# Patient Record
Sex: MALE | Race: WHITE
[De-identification: names, ages, dates, MRNs, and addresses within clinical notes are randomized per-mention and may not be internally consistent; named-entity substitution may affect disease eponyms.]

---

## 2018-01-06 ENCOUNTER — HOSPITAL ENCOUNTER (EMERGENCY)
Dept: HOSPITAL 80 - FED | Age: 58
LOS: 1 days | Discharge: HOME | End: 2018-01-07
Payer: COMMERCIAL

## 2018-01-06 VITALS — TEMPERATURE: 97.7 F | RESPIRATION RATE: 16 BRPM
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
## 2018-01-06 DIAGNOSIS — I10: ICD-10-CM

## 2018-01-06 DIAGNOSIS — Z04.1: ICD-10-CM
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
## 2018-01-06 DIAGNOSIS — F10.920: Primary | ICD-10-CM

## 2018-01-07 VITALS — DIASTOLIC BLOOD PRESSURE: 80 MMHG | OXYGEN SATURATION: 95 % | HEART RATE: 88 BPM | SYSTOLIC BLOOD PRESSURE: 121 MMHG

## 2019-03-23 ENCOUNTER — HOSPITAL ENCOUNTER (EMERGENCY)
Dept: HOSPITAL 80 - FED | Age: 59
Discharge: HOME | End: 2019-03-23
Payer: COMMERCIAL

## 2019-03-23 VITALS — DIASTOLIC BLOOD PRESSURE: 77 MMHG | SYSTOLIC BLOOD PRESSURE: 129 MMHG

## 2019-03-23 DIAGNOSIS — Z47.1: Primary | ICD-10-CM

## 2019-03-23 DIAGNOSIS — Z96.641: ICD-10-CM

## 2019-03-23 NOTE — EDPHY
H & P


Time Seen by Provider: 03/23/19 22:26


HPI/ROS: 





Chief complaint: Surgical incision check





History of present illness:  This is a 58-year-old male who presents to the 

emergency department for evaluation of a surgical incision to his right hip 

from a right total hip replacement that occurred 6 days ago [facility].  He 

states over the last few days he has noticed increasing discharge from the 

wound.  It has soaked the surgical wound covering.  He has talked with the on-

call nurse for the orthopedic group who stated this was normal.  He denies 

increasing pain in the region.  He denies pustular discharge.  He denies fevers

, he has been monitoring his temperature constantly.


Smoking Status: Never smoked


Physical Exam: 





General:  Alert, nontoxic.


Skin:  There is a vertically oriented large surgical incision to the right hip 

with staples in place.  There is no dehiscence.  There is no discharge from the 

wound.  The tissue appears healthy.  There is no surrounding erythema or edema.

  No warmth or tenderness on palpation.


Constitutional: 


 Initial Vital Signs











Temperature (C)  36.7 C   03/23/19 22:11


 


Heart Rate  82   03/23/19 22:11


 


Respiratory Rate  16   03/23/19 22:11


 


Blood Pressure  124/75 H  03/23/19 22:11


 


O2 Sat (%)  95   03/23/19 22:11








 











O2 Delivery Mode               Room Air














Allergies/Adverse Reactions: 


 





bee venom protein (honey bee) Allergy (Verified 03/23/19 22:10)


 








Home Medications: 














 Medication  Instructions  Recorded


 


Htn Med  04/12/16


 


Allopurinol  03/23/19


 


Aspirin 81mg (*)  03/23/19


 


Flexeril 10 MG (*)  03/23/19


 


Lovenox 40 MG (*)  03/23/19


 


Multivitamin  03/23/19


 


Percocet 5-325 mg Tablet  03/23/19


 


amLODIPine BESYLATE/BENAZEPRIL  03/23/19














MDM/Departure





- MDM


Medications Given: 


 








Discontinued Medications





Chlorhexidine Gluconate (Hibiclens) Confirm Administered Dose 1 btl TP .STK-MED 

ONE


   Stop: 03/23/19 23:03


   Last Admin: 03/23/19 23:19 Dose:  2 oz





ED Course/Re-evaluation: 





Patient seen under the supervision of my secondary supervising physician Dr. Talat Finley.  Patient presents to the emergency department for surgical 

incisions site wound recheck.  The incision appears to be well healing.  

Staples are in place.  No dehiscence.  No discharge from the wound.  No 

surrounding erythema or edema.  No tenderness.  No warmth.  I do not suspect a 

surgical incisions site infection at this time.  I have consulted with [facility] orthopedic doctor,  who is comfortable with patient being 

discharged home.  Home care is discussed.  They are to follow up with 

Orthopedics for for recheck.  Strict return precautions are given.  The patient 

voiced understanding and agreement with plan.


Differential Diagnosis: 





Included but not limited to postsurgical wound discharge, postsurgical wound 

infection





- Depart


Disposition: Home, Routine, Self-Care


Clinical Impression: 


 Encounter for wound re-check





Condition: Good


Instructions:  Staple Care (ED)


Additional Instructions: 


Follow-up with your orthopedic surgeon as scheduled for continued evaluation 

and care





If symptoms worsen or new symptoms develop return to the emergency room for 

recheck


Referrals: 


Gene Solis MD [Primary Care Provider] - As per Instructions